# Patient Record
Sex: FEMALE | Race: WHITE
[De-identification: names, ages, dates, MRNs, and addresses within clinical notes are randomized per-mention and may not be internally consistent; named-entity substitution may affect disease eponyms.]

---

## 2017-05-16 ENCOUNTER — HOSPITAL ENCOUNTER (OUTPATIENT)
Dept: HOSPITAL 76 - LAB | Age: 62
Discharge: HOME | End: 2017-05-16
Attending: PHYSICIAN ASSISTANT
Payer: COMMERCIAL

## 2017-05-16 DIAGNOSIS — E55.9: ICD-10-CM

## 2017-05-16 DIAGNOSIS — Z79.899: ICD-10-CM

## 2017-05-16 DIAGNOSIS — Z00.00: Primary | ICD-10-CM

## 2017-05-16 DIAGNOSIS — F32.9: ICD-10-CM

## 2017-05-16 DIAGNOSIS — E78.2: ICD-10-CM

## 2017-05-16 LAB
ALBUMIN/GLOB SERPL: 1.2 {RATIO} (ref 1–2.2)
ANION GAP SERPL CALCULATED.4IONS-SCNC: 9 MMOL/L (ref 6–13)
BASOPHILS NFR BLD AUTO: 0 10^3/UL (ref 0–0.1)
BASOPHILS NFR BLD AUTO: 0.9 %
BILIRUB BLD-MCNC: 0.7 MG/DL (ref 0.2–1)
BUN SERPL-MCNC: 15 MG/DL (ref 6–20)
CALCIUM UR-MCNC: 9.1 MG/DL (ref 8.5–10.3)
CHLORIDE SERPL-SCNC: 105 MMOL/L (ref 101–111)
CHOLEST SERPL-MCNC: 186 MG/DL
CO2 SERPL-SCNC: 26 MMOL/L (ref 21–32)
CREAT SERPLBLD-SCNC: 0.8 MG/DL (ref 0.4–1)
EOSINOPHIL # BLD AUTO: 0.3 10^3/UL (ref 0–0.7)
EOSINOPHIL NFR BLD AUTO: 4.8 %
ERYTHROCYTE [DISTWIDTH] IN BLOOD BY AUTOMATED COUNT: 14.2 % (ref 12–15)
GFRSERPLBLD MDRD-ARVRAT: 73 ML/MIN/{1.73_M2} (ref 89–?)
GLOBULIN SER-MCNC: 3.2 G/DL (ref 2.1–4.2)
GLUCOSE SERPL-MCNC: 108 MG/DL (ref 70–100)
HCT VFR BLD AUTO: 40.5 % (ref 37–47)
HDLC SERPL-MCNC: 46 MG/DL
HDLC SERPL: 4 {RATIO} (ref ?–4.4)
HGB UR QL STRIP: 13.9 G/DL (ref 12–16)
LDLC/HDLC SERPL: 2.6 {RATIO} (ref ?–4.4)
LYMPHOCYTES # SPEC AUTO: 1.8 10^3/UL (ref 1.5–3.5)
LYMPHOCYTES NFR BLD AUTO: 34.5 %
MCH RBC QN AUTO: 29.7 PG (ref 27–31)
MCHC RBC AUTO-ENTMCNC: 34.2 G/DL (ref 32–36)
MCV RBC AUTO: 86.8 FL (ref 81–99)
MONOCYTES # BLD AUTO: 0.4 10^3/UL (ref 0–1)
MONOCYTES NFR BLD AUTO: 6.8 %
NEUTROPHILS # BLD AUTO: 2.8 10^3/UL (ref 1.5–6.6)
NEUTROPHILS # SNV AUTO: 5.3 X10^3/UL (ref 4.8–10.8)
NEUTROPHILS NFR BLD AUTO: 53 %
NRBC # BLD AUTO: 0.1 /100WBC
PDW BLD AUTO: 8.6 FL (ref 7.9–10.8)
POTASSIUM SERPL-SCNC: 3.9 MMOL/L (ref 3.5–5)
PROT SPEC-MCNC: 7 G/DL (ref 6.7–8.2)
RBC MAR: 4.67 10^6/UL (ref 4.2–5.4)
SODIUM SERPLBLD-SCNC: 140 MMOL/L (ref 135–145)
TRIGL P FAST SERPL-MCNC: 99 MG/DL
VLDLC SERPL-SCNC: 20 MG/DL
WBC # BLD: 5.3 X10^3/UL

## 2017-05-16 PROCEDURE — 36415 COLL VENOUS BLD VENIPUNCTURE: CPT

## 2017-05-16 PROCEDURE — 85025 COMPLETE CBC W/AUTO DIFF WBC: CPT

## 2017-05-16 PROCEDURE — 80061 LIPID PANEL: CPT

## 2017-05-16 PROCEDURE — 82306 VITAMIN D 25 HYDROXY: CPT

## 2017-05-16 PROCEDURE — 84443 ASSAY THYROID STIM HORMONE: CPT

## 2017-05-16 PROCEDURE — 80053 COMPREHEN METABOLIC PANEL: CPT

## 2017-06-02 ENCOUNTER — HOSPITAL ENCOUNTER (OUTPATIENT)
Dept: HOSPITAL 76 - DI | Age: 62
Discharge: HOME | End: 2017-06-02
Attending: PHYSICIAN ASSISTANT
Payer: COMMERCIAL

## 2017-06-02 DIAGNOSIS — Z12.31: Primary | ICD-10-CM

## 2017-06-02 DIAGNOSIS — Z78.0: ICD-10-CM

## 2017-06-02 DIAGNOSIS — Z13.820: Primary | ICD-10-CM

## 2017-06-02 DIAGNOSIS — Z85.3: ICD-10-CM

## 2017-06-02 DIAGNOSIS — M85.88: ICD-10-CM

## 2017-06-02 PROCEDURE — 77080 DXA BONE DENSITY AXIAL: CPT

## 2017-06-02 PROCEDURE — 77067 SCR MAMMO BI INCL CAD: CPT

## 2017-06-04 NOTE — DEXA REPORT
DEXA SCAN: 06/02/2017



CLINICAL INDICATION: Postmenopausal. 



TECHNIQUE: Dual energy x-ray absorptiometry (DXA) was performed on a [employer] 
system. Regions measured are the AP spine, femoral neck, and, if needed, 
forearm.



COMPARISON: None. In accordance with the International Society for Clinical 
Densitometry (ISCD) guidelines, data from previous exams may be reanalyzed 
using current recommendations and techniques. This is done to allow a more 
accurate basis for comparison with the current study.



FINDINGS: The data for the lumbar spine is as follows:









REGION BMD (g/cm/cm) T-SCORE Z-SCORE

 

L1 0.993 -1.1 -1.0

 

L2 1.242 0.4 0.4

 

L3 1.251 0.4 0.5

 

L4 1.207 0.1 0.2

 

TOTAL 1.174 0.0 0.0





NOTE: All evaluable vertebrae are used for classification.



The data for the hip is as follows:









REGION BMD (g/cm/cm) T-SCORE Z-SCORE

 

Neck 0.888 -1.1 -0.6

 

TOTAL 0.848 -1.3 -1.1





NOTE: The femoral neck or total proximal femur, whichever is lowest, is used 
for classification.



* Denotes significant change at the 95% confidence level. 

** Denotes dissimilar scan types or analysis methods.



IMPRESSION: THE WHO CLASSIFICATION BASED ON THE INTERNATIONAL REFERENCE 
STANDARD IS OSTEOPENIA. THE FRACTURE RISK IS INCREASED. 



RECOMMENDATION: Patients with diagnosis of osteoporosis or osteopenia should 
have regular bone mineral density assessment. For those eligible for Medicare, 
routine testing is allowed once every 2 years. Testing frequency can be 
increased for patients who have rapidly progressing disease or for those who 
are receiving medical therapy to restore bone mass. 



COMMENT: World Health Organization (WHO) definitions for osteoporosis and 
osteopenia:

NORMAL BMD: T-score at -1.0 or higher, fracture risk is low.

OSTEOPENIA BMD: T-score between -1.0 and -2.5, fracture risk is increased.

OSTEOPOROSIS BMD: T-score at -2.5 or lower, fracture risk high.



National Osteoporosis Foundation recommends:

1. Obtain adequate dietary calcium (at least 1200 mg per day) and vitamin D (400
-800 international units per day).

2. Participate, as appropriate, in regular weightbearing and muscle-
strengthening exercise. 

3. Avoid tobacco use and reduce alcohol and caffeine intake. 

4. For more detailed information see the website at www.NOF.org.
MTDD

## 2017-06-05 NOTE — MAMMOGRAPHY REPORT
DIGITAL SCREENING MAMMOGRAM:  06/02/2017

 

CLINICAL INDICATION:  A 61-year-old with personal history of left breast cancer status post lumpectom
y and radiation therapy, history of benign left breast biopsy.

 

COMPARISON:  04/2016, 04/2015, 04/2014, 04/2013, 04/2012, 04/2011, 04/2010, 09/2009.

 

TECHNIQUE:  Routine CC and MLO projections were obtained of the breasts. 

 

FINDINGS:  The breasts again demonstrate heterogeneously dense fibroglandular parenchyma bilaterally.
  Postoperative and post-treatment changes in the left inner breast and post-biopsy changes in the le
ft upper outer quadrant are stable.  Coarse and punctate, typically benign calcifications are present
.  No suspicious masses, clustered microcalcifications, or regions of architectural distortion are id
entified. 

 

IMPRESSION:  BENIGN FINDINGS.

 

RECOMMENDATION:  Routine annual screening unless otherwise clinically indicated.

 

BIRADS CATEGORY 2 - BENIGN FINDINGS.

 

STANDARD QUALIFYING STATEMENTS

 

1. This examination was reviewed with the aid of Computer-Aided Detection (CAD).

 

2. A negative or benign imaging report should not delay biopsy if clinically suspicious findings are 
present. Consider surgical consultation if warranted. More than 5% of cancers are not identified by i
maging.

 

3. Dense breasts may obscure an underlying neoplasm.

 

 

 

 

 

DD:06/05/2017 13:21:50  DT: 06/05/2017 13:32  JOB #: [id number]  EXT JOB #:[id number]

## 2018-01-18 ENCOUNTER — HOSPITAL ENCOUNTER (OUTPATIENT)
Dept: HOSPITAL 76 - LAB | Age: 63
Discharge: HOME | End: 2018-01-18
Attending: PHYSICIAN ASSISTANT
Payer: COMMERCIAL

## 2018-01-18 DIAGNOSIS — R53.83: Primary | ICD-10-CM

## 2018-01-18 DIAGNOSIS — F32.9: ICD-10-CM

## 2018-01-18 LAB
T4 FREE SERPL-MCNC: 0.63 NG/DL (ref 0.58–1.64)
TSH SERPL-ACNC: 2.02 UIU/ML (ref 0.34–5.6)

## 2018-01-18 PROCEDURE — 84481 FREE ASSAY (FT-3): CPT

## 2018-01-18 PROCEDURE — 84443 ASSAY THYROID STIM HORMONE: CPT

## 2018-01-18 PROCEDURE — 84439 ASSAY OF FREE THYROXINE: CPT

## 2018-01-18 PROCEDURE — 36415 COLL VENOUS BLD VENIPUNCTURE: CPT

## 2018-06-01 ENCOUNTER — HOSPITAL ENCOUNTER (OUTPATIENT)
Dept: HOSPITAL 76 - LAB | Age: 63
Discharge: HOME | End: 2018-06-01
Attending: PHYSICIAN ASSISTANT
Payer: COMMERCIAL

## 2018-06-01 DIAGNOSIS — Z00.00: Primary | ICD-10-CM

## 2018-06-01 DIAGNOSIS — Z72.89: ICD-10-CM

## 2018-06-01 LAB
ALBUMIN DIAFP-MCNC: 3.9 G/DL (ref 3.2–5.5)
ALBUMIN/GLOB SERPL: 1.1 {RATIO} (ref 1–2.2)
ALP SERPL-CCNC: 118 IU/L (ref 42–121)
ALT SERPL W P-5'-P-CCNC: 16 IU/L (ref 10–60)
ANION GAP SERPL CALCULATED.4IONS-SCNC: 9 MMOL/L (ref 6–13)
AST SERPL W P-5'-P-CCNC: 18 IU/L (ref 10–42)
BASOPHILS NFR BLD AUTO: 0.1 10^3/UL (ref 0–0.1)
BASOPHILS NFR BLD AUTO: 1.1 %
BILIRUB BLD-MCNC: 1.3 MG/DL (ref 0.2–1)
BUN SERPL-MCNC: 13 MG/DL (ref 6–20)
CALCIUM UR-MCNC: 9 MG/DL (ref 8.5–10.3)
CHLORIDE SERPL-SCNC: 102 MMOL/L (ref 101–111)
CHOLEST SERPL-MCNC: 229 MG/DL
CO2 SERPL-SCNC: 24 MMOL/L (ref 21–32)
CREAT SERPLBLD-SCNC: 0.9 MG/DL (ref 0.4–1)
EOSINOPHIL # BLD AUTO: 0.2 10^3/UL (ref 0–0.7)
EOSINOPHIL NFR BLD AUTO: 4.5 %
ERYTHROCYTE [DISTWIDTH] IN BLOOD BY AUTOMATED COUNT: 14.2 % (ref 12–15)
GFRSERPLBLD MDRD-ARVRAT: 64 ML/MIN/{1.73_M2} (ref 89–?)
GLOBULIN SER-MCNC: 3.5 G/DL (ref 2.1–4.2)
GLUCOSE SERPL-MCNC: 119 MG/DL (ref 70–100)
HDLC SERPL-MCNC: 47 MG/DL
HDLC SERPL: 4.9 {RATIO} (ref ?–4.4)
HGB UR QL STRIP: 14.3 G/DL (ref 12–16)
LDLC SERPL CALC-MCNC: 152 MG/DL
LDLC/HDLC SERPL: 3.2 {RATIO} (ref ?–4.4)
LYMPHOCYTES # SPEC AUTO: 1.4 10^3/UL (ref 1.5–3.5)
LYMPHOCYTES NFR BLD AUTO: 28.4 %
MCH RBC QN AUTO: 29.5 PG (ref 27–31)
MCHC RBC AUTO-ENTMCNC: 34.1 G/DL (ref 32–36)
MCV RBC AUTO: 86.4 FL (ref 81–99)
MONOCYTES # BLD AUTO: 0.4 10^3/UL (ref 0–1)
MONOCYTES NFR BLD AUTO: 7.5 %
NEUTROPHILS # BLD AUTO: 3 10^3/UL (ref 1.5–6.6)
NEUTROPHILS # SNV AUTO: 5 X10^3/UL (ref 4.8–10.8)
NEUTROPHILS NFR BLD AUTO: 58.5 %
PDW BLD AUTO: 8.3 FL (ref 7.9–10.8)
PLATELET # BLD: 227 10^3/UL (ref 130–450)
PROT SPEC-MCNC: 7.4 G/DL (ref 6.7–8.2)
RBC MAR: 4.85 10^6/UL (ref 4.2–5.4)
SODIUM SERPLBLD-SCNC: 135 MMOL/L (ref 135–145)
VLDLC SERPL-SCNC: 30 MG/DL

## 2018-06-01 PROCEDURE — 80061 LIPID PANEL: CPT

## 2018-06-01 PROCEDURE — 84443 ASSAY THYROID STIM HORMONE: CPT

## 2018-06-01 PROCEDURE — 86803 HEPATITIS C AB TEST: CPT

## 2018-06-01 PROCEDURE — 83721 ASSAY OF BLOOD LIPOPROTEIN: CPT

## 2018-06-01 PROCEDURE — 36415 COLL VENOUS BLD VENIPUNCTURE: CPT

## 2018-06-01 PROCEDURE — 85025 COMPLETE CBC W/AUTO DIFF WBC: CPT

## 2018-06-01 PROCEDURE — 80053 COMPREHEN METABOLIC PANEL: CPT

## 2018-06-02 LAB
HEPATITIS C ANTIBODY: (no result)
SIGNAL TO CUT-OFF: 0.1 (ref ?–1)

## 2018-06-08 ENCOUNTER — HOSPITAL ENCOUNTER (OUTPATIENT)
Dept: HOSPITAL 76 - DI | Age: 63
Discharge: HOME | End: 2018-06-08
Attending: PHYSICIAN ASSISTANT
Payer: COMMERCIAL

## 2018-06-08 DIAGNOSIS — Z12.31: Primary | ICD-10-CM

## 2018-06-08 DIAGNOSIS — Z85.3: ICD-10-CM

## 2018-06-08 PROCEDURE — 77067 SCR MAMMO BI INCL CAD: CPT

## 2018-06-14 NOTE — MAMMOGRAPHY REPORT
Procedure Date:  06/08/2018   

Accession Number:  [id number] / [id number]                    

Procedure:  RUBINA - Screening Mammo Dig Bilat CPT Code:  

 

FULL RESULT:

 

 

EXAM: Screening Mammo Dig Bilat

 

DATE: 6/8/2018 4:30 PM

 

CLINICAL HISTORY: 62-year-old with personal history of left breast cancer 

status post lumpectomy and radiation therapy for screening.

 

TECHNIQUE: Bilateral CC and MLO views were obtained.

 

COMPARISON: 6/2/2017, 4/25/2016, 4/22/2015, 4/15/2014, 4/11/2013, 

4/4/2012, 4/12/2011, 4/6/2010

 

FINDINGS:

The breasts demonstrate heterogeneously dense fibroglandular parenchyma 

bilaterally. Postoperative and posttreatment changes in the left inner 

breast are stable. Coarse, typically benign calcifications are present. 

Postbiopsy changes in the left outer breast are stable. No suspicious 

masses, clustered microcalcifications, or regions of architectural 

distortion are identified.

 

IMPRESSION:

Benign findings

 

RECOMMENDATION: Routine annual screening unless otherwise clinically 

indicated.

 

BIRADS CATEGORY 2: Benign findings

 

STANDARD QUALIFYING STATEMENTS:

1.  This examination was reviewed with the aid of Computer-Aided 

Detection (CAD).

2.  A negative or benign  imaging report should not delay biopsy if 

clinically suspicious findings are present.  Consider surgical 

consultation if warrented.  More than 5% of cancers are not identified by 

imaging.

3.  Dense breasts may obscure an underlying neoplasm.

## 2019-01-31 ENCOUNTER — HOSPITAL ENCOUNTER (OUTPATIENT)
Dept: HOSPITAL 76 - LAB | Age: 64
Discharge: HOME | End: 2019-01-31
Attending: PHYSICIAN ASSISTANT
Payer: COMMERCIAL

## 2019-01-31 DIAGNOSIS — E78.5: ICD-10-CM

## 2019-01-31 DIAGNOSIS — Z79.899: ICD-10-CM

## 2019-01-31 DIAGNOSIS — R73.9: Primary | ICD-10-CM

## 2019-01-31 LAB
ANION GAP SERPL CALCULATED.4IONS-SCNC: 11 MMOL/L (ref 6–13)
BUN SERPL-MCNC: 14 MG/DL (ref 6–20)
CALCIUM UR-MCNC: 9.1 MG/DL (ref 8.5–10.3)
CHLORIDE SERPL-SCNC: 98 MMOL/L (ref 101–111)
CHOLEST SERPL-MCNC: 246 MG/DL
CO2 SERPL-SCNC: 26 MMOL/L (ref 21–32)
CREAT SERPLBLD-SCNC: 0.9 MG/DL (ref 0.4–1)
EST. AVERAGE GLUCOSE BLD GHB EST-MCNC: 117 MG/DL (ref 70–100)
GFRSERPLBLD MDRD-ARVRAT: 63 ML/MIN/{1.73_M2} (ref 89–?)
GLUCOSE SERPL-MCNC: 116 MG/DL (ref 70–100)
HB2 TOTAL: 15.8 G/DL
HBA1C BLD-MCNC: 0.62 G/DL
HDLC SERPL-MCNC: 45 MG/DL
HDLC SERPL: 5.5 {RATIO} (ref ?–4.4)
HEMOGLOBIN A1C %: 5.7 % (ref 4.6–6.2)
LDLC SERPL CALC-MCNC: 170 MG/DL
LDLC/HDLC SERPL: 3.8 {RATIO} (ref ?–4.4)
SODIUM SERPLBLD-SCNC: 135 MMOL/L (ref 135–145)
VLDLC SERPL-SCNC: 31 MG/DL

## 2019-01-31 PROCEDURE — 83036 HEMOGLOBIN GLYCOSYLATED A1C: CPT

## 2019-01-31 PROCEDURE — 80061 LIPID PANEL: CPT

## 2019-01-31 PROCEDURE — 83721 ASSAY OF BLOOD LIPOPROTEIN: CPT

## 2019-01-31 PROCEDURE — 80048 BASIC METABOLIC PNL TOTAL CA: CPT

## 2019-01-31 PROCEDURE — 36415 COLL VENOUS BLD VENIPUNCTURE: CPT

## 2019-11-07 ENCOUNTER — HOSPITAL ENCOUNTER (OUTPATIENT)
Dept: HOSPITAL 76 - DI.N | Age: 64
Discharge: HOME | End: 2019-11-07
Attending: GENERAL ACUTE CARE HOSPITAL
Payer: COMMERCIAL

## 2019-11-07 DIAGNOSIS — Z12.31: Primary | ICD-10-CM

## 2019-11-07 DIAGNOSIS — Z85.3: ICD-10-CM

## 2019-11-07 DIAGNOSIS — Z08: ICD-10-CM

## 2019-11-07 PROCEDURE — 77067 SCR MAMMO BI INCL CAD: CPT

## 2019-11-08 NOTE — MAMMOGRAPHY REPORT
Reason:  SCREENING MAMMO

Procedure Date:  11/07/2019   

Accession Number:  [id number] / [id number]                    

Procedure:  MGN - Screening Mammo Dig Bilat CPT Code:  

 

***Final Report***

 

 

FULL RESULT:

 

 

EXAM: Screening Mammo Dig Bilat

 

DATE: 11/7/2019 2:57 PM

 

CLINICAL HISTORY:  Screening encounter. History of early menses and 

personal breast cancer history in the left breast status post lumpectomy, 

2008.

 

TECHNIQUE: (B) - Bilateral  CC and MLO views were obtained.

 

COMPARISON: 6/8/2018 through 4/11/2013.

 

PARENCHYMAL PATTERN: (D) - The breast(s) demonstrate(s) heterogeneously 

dense fibroglandular parenchyma.

 

FINDINGS:

Posttreatment changes in the left breast are stable. There are bilateral 

typically benign vascular calcifications. There are no suspicious masses, 

calcifications, or areas of distortion.

 

IMPRESSION: Benign findings. BI-RADS category 2.

 

RECOMMENDATION: (ANNUAL)  - Recommend routine annual screening 

mammography.

 

BI-RADS CATEGORY: (2) - Benign Findings.

 

STANDARD QUALIFYING STATEMENTS:

1.  This examination was not reviewed with the aid of Computer-Aided 

Detection (CAD).

2.  A negative or benign  imaging report should not preclude biopsy if 

clinically suspicious findings are present.

3.  Dense breasts may obscure an underlying neoplasm.

4. This examination was reviewed without the aid of 3D breast imaging 

(tomosynthesis).

## 2020-03-02 ENCOUNTER — HOSPITAL ENCOUNTER (OUTPATIENT)
Dept: HOSPITAL 76 - LAB | Age: 65
Discharge: HOME | End: 2020-03-02
Attending: NURSE PRACTITIONER
Payer: COMMERCIAL

## 2020-03-02 DIAGNOSIS — E55.9: ICD-10-CM

## 2020-03-02 DIAGNOSIS — R73.9: ICD-10-CM

## 2020-03-02 DIAGNOSIS — Z79.899: ICD-10-CM

## 2020-03-02 DIAGNOSIS — Z00.00: Primary | ICD-10-CM

## 2020-03-02 DIAGNOSIS — M85.80: ICD-10-CM

## 2020-03-02 LAB
ALBUMIN DIAFP-MCNC: 3.8 G/DL (ref 3.2–5.5)
ALBUMIN/GLOB SERPL: 1.1 {RATIO} (ref 1–2.2)
ALP SERPL-CCNC: 97 IU/L (ref 42–121)
ALT SERPL W P-5'-P-CCNC: 18 IU/L (ref 10–60)
ANION GAP SERPL CALCULATED.4IONS-SCNC: 12 MMOL/L (ref 6–13)
AST SERPL W P-5'-P-CCNC: 20 IU/L (ref 10–42)
BASOPHILS NFR BLD AUTO: 0 10^3/UL (ref 0–0.1)
BASOPHILS NFR BLD AUTO: 0.6 %
BILIRUB BLD-MCNC: 1.1 MG/DL (ref 0.2–1)
BUN SERPL-MCNC: 13 MG/DL (ref 6–20)
CALCIUM UR-MCNC: 8.9 MG/DL (ref 8.5–10.3)
CHLORIDE SERPL-SCNC: 103 MMOL/L (ref 101–111)
CHOLEST SERPL-MCNC: 218 MG/DL
CO2 SERPL-SCNC: 21 MMOL/L (ref 21–32)
CREAT SERPLBLD-SCNC: 1 MG/DL (ref 0.4–1)
EOSINOPHIL # BLD AUTO: 0.3 10^3/UL (ref 0–0.7)
EOSINOPHIL NFR BLD AUTO: 4.2 %
ERYTHROCYTE [DISTWIDTH] IN BLOOD BY AUTOMATED COUNT: 13.5 % (ref 12–15)
EST. AVERAGE GLUCOSE BLD GHB EST-MCNC: 120 MG/DL (ref 70–100)
GFRSERPLBLD MDRD-ARVRAT: 56 ML/MIN/{1.73_M2} (ref 89–?)
GLOBULIN SER-MCNC: 3.4 G/DL (ref 2.1–4.2)
GLUCOSE SERPL-MCNC: 168 MG/DL (ref 70–100)
HB2 TOTAL: 15.6 G/DL
HBA1C BLD-MCNC: 0.62 G/DL
HDLC SERPL-MCNC: 45 MG/DL
HDLC SERPL: 4.8 {RATIO} (ref ?–4.4)
HEMOGLOBIN A1C %: 5.8 % (ref 4.6–6.2)
HGB UR QL STRIP: 14.8 G/DL (ref 12–16)
LDLC SERPL CALC-MCNC: 130 MG/DL
LDLC/HDLC SERPL: 2.9 {RATIO} (ref ?–4.4)
LYMPHOCYTES # SPEC AUTO: 2 10^3/UL (ref 1.5–3.5)
LYMPHOCYTES NFR BLD AUTO: 28.3 %
MCH RBC QN AUTO: 28.8 PG (ref 27–31)
MCHC RBC AUTO-ENTMCNC: 32.8 G/DL (ref 32–36)
MCV RBC AUTO: 87.9 FL (ref 81–99)
MONOCYTES # BLD AUTO: 0.5 10^3/UL (ref 0–1)
MONOCYTES NFR BLD AUTO: 7.3 %
NEUTROPHILS # BLD AUTO: 4.1 10^3/UL (ref 1.5–6.6)
NEUTROPHILS # SNV AUTO: 6.9 X10^3/UL (ref 4.8–10.8)
NEUTROPHILS NFR BLD AUTO: 59.5 %
PDW BLD AUTO: 10.3 FL (ref 7.9–10.8)
PLATELET # BLD: 257 10^3/UL (ref 130–450)
PROT SPEC-MCNC: 7.2 G/DL (ref 6.7–8.2)
RBC MAR: 5.13 10^6/UL (ref 4.2–5.4)
SODIUM SERPLBLD-SCNC: 136 MMOL/L (ref 135–145)
VLDLC SERPL-SCNC: 43 MG/DL

## 2020-03-02 PROCEDURE — 82306 VITAMIN D 25 HYDROXY: CPT

## 2020-03-02 PROCEDURE — 83036 HEMOGLOBIN GLYCOSYLATED A1C: CPT

## 2020-03-02 PROCEDURE — 83721 ASSAY OF BLOOD LIPOPROTEIN: CPT

## 2020-03-02 PROCEDURE — 36415 COLL VENOUS BLD VENIPUNCTURE: CPT

## 2020-03-02 PROCEDURE — 84443 ASSAY THYROID STIM HORMONE: CPT

## 2020-03-02 PROCEDURE — 80053 COMPREHEN METABOLIC PANEL: CPT

## 2020-03-02 PROCEDURE — 85025 COMPLETE CBC W/AUTO DIFF WBC: CPT

## 2020-03-02 PROCEDURE — 80061 LIPID PANEL: CPT

## 2020-06-25 ENCOUNTER — HOSPITAL ENCOUNTER (OUTPATIENT)
Dept: HOSPITAL 76 - SDS | Age: 65
Discharge: HOME | End: 2020-06-25
Attending: SURGERY
Payer: COMMERCIAL

## 2020-06-25 VITALS — SYSTOLIC BLOOD PRESSURE: 145 MMHG | DIASTOLIC BLOOD PRESSURE: 76 MMHG

## 2020-06-25 DIAGNOSIS — D12.2: ICD-10-CM

## 2020-06-25 DIAGNOSIS — I25.10: ICD-10-CM

## 2020-06-25 DIAGNOSIS — K64.8: ICD-10-CM

## 2020-06-25 DIAGNOSIS — Z12.11: Primary | ICD-10-CM

## 2020-06-25 PROCEDURE — 0DBF8ZZ EXCISION OF RIGHT LARGE INTESTINE, VIA NATURAL OR ARTIFICIAL OPENING ENDOSCOPIC: ICD-10-PCS | Performed by: SURGERY

## 2020-06-25 PROCEDURE — 45380 COLONOSCOPY AND BIOPSY: CPT

## 2021-03-18 ENCOUNTER — HOSPITAL ENCOUNTER (OUTPATIENT)
Dept: HOSPITAL 76 - LAB | Age: 66
Discharge: HOME | End: 2021-03-18
Attending: NURSE PRACTITIONER
Payer: COMMERCIAL

## 2021-03-18 DIAGNOSIS — R73.9: ICD-10-CM

## 2021-03-18 DIAGNOSIS — E55.9: ICD-10-CM

## 2021-03-18 DIAGNOSIS — Z79.899: ICD-10-CM

## 2021-03-18 DIAGNOSIS — E78.49: ICD-10-CM

## 2021-03-18 DIAGNOSIS — Z00.00: Primary | ICD-10-CM

## 2021-03-18 LAB
ALBUMIN DIAFP-MCNC: 3.7 G/DL (ref 3.2–5.5)
ALBUMIN/GLOB SERPL: 1.1 {RATIO} (ref 1–2.2)
ALP SERPL-CCNC: 119 IU/L (ref 42–121)
ALT SERPL W P-5'-P-CCNC: 15 IU/L (ref 10–60)
ANION GAP SERPL CALCULATED.4IONS-SCNC: 11 MMOL/L (ref 6–13)
AST SERPL W P-5'-P-CCNC: 16 IU/L (ref 10–42)
BASOPHILS NFR BLD AUTO: 0 10^3/UL (ref 0–0.1)
BASOPHILS NFR BLD AUTO: 0.8 %
BILIRUB BLD-MCNC: 1 MG/DL (ref 0.2–1)
BUN SERPL-MCNC: 12 MG/DL (ref 6–20)
CALCIUM UR-MCNC: 9 MG/DL (ref 8.5–10.3)
CHLORIDE SERPL-SCNC: 99 MMOL/L (ref 101–111)
CHOLEST SERPL-MCNC: 239 MG/DL
CO2 SERPL-SCNC: 25 MMOL/L (ref 21–32)
CREAT SERPLBLD-SCNC: 1 MG/DL (ref 0.4–1)
EOSINOPHIL # BLD AUTO: 0.2 10^3/UL (ref 0–0.7)
EOSINOPHIL NFR BLD AUTO: 3.9 %
ERYTHROCYTE [DISTWIDTH] IN BLOOD BY AUTOMATED COUNT: 13.7 % (ref 12–15)
GFRSERPLBLD MDRD-ARVRAT: 56 ML/MIN/{1.73_M2} (ref 89–?)
GLOBULIN SER-MCNC: 3.3 G/DL (ref 2.1–4.2)
GLUCOSE SERPL-MCNC: 121 MG/DL (ref 70–100)
HCT VFR BLD AUTO: 45 % (ref 37–47)
HDLC SERPL-MCNC: 48 MG/DL
HDLC SERPL: 5 {RATIO} (ref ?–4.4)
HGB UR QL STRIP: 14.8 G/DL (ref 12–16)
LDLC SERPL CALC-MCNC: 158 MG/DL
LDLC/HDLC SERPL: 3.3 {RATIO} (ref ?–4.4)
LYMPHOCYTES # SPEC AUTO: 1.7 10^3/UL (ref 1.5–3.5)
LYMPHOCYTES NFR BLD AUTO: 34 %
MCH RBC QN AUTO: 30.1 PG (ref 27–31)
MCHC RBC AUTO-ENTMCNC: 32.9 G/DL (ref 32–36)
MCV RBC AUTO: 91.5 FL (ref 81–99)
MONOCYTES # BLD AUTO: 0.4 10^3/UL (ref 0–1)
MONOCYTES NFR BLD AUTO: 7.6 %
NEUTROPHILS # BLD AUTO: 2.6 10^3/UL (ref 1.5–6.6)
NEUTROPHILS # SNV AUTO: 4.9 X10^3/UL (ref 4.8–10.8)
NEUTROPHILS NFR BLD AUTO: 53.7 %
NRBC # BLD AUTO: 0 /100WBC
NRBC # BLD AUTO: 0 X10^3/UL
PDW BLD AUTO: 10.3 FL (ref 7.9–10.8)
PLATELET # BLD: 240 10^3/UL (ref 130–450)
POTASSIUM SERPL-SCNC: 3.9 MMOL/L (ref 3.5–5)
PROT SPEC-MCNC: 7 G/DL (ref 6.7–8.2)
RBC MAR: 4.92 10^6/UL (ref 4.2–5.4)
SODIUM SERPLBLD-SCNC: 135 MMOL/L (ref 135–145)
TRIGL P FAST SERPL-MCNC: 165 MG/DL
TSH SERPL-ACNC: 2.42 UIU/ML (ref 0.34–5.6)
VLDLC SERPL-SCNC: 33 MG/DL

## 2021-03-18 PROCEDURE — 85025 COMPLETE CBC W/AUTO DIFF WBC: CPT

## 2021-03-18 PROCEDURE — 82306 VITAMIN D 25 HYDROXY: CPT

## 2021-03-18 PROCEDURE — 84443 ASSAY THYROID STIM HORMONE: CPT

## 2021-03-18 PROCEDURE — 80053 COMPREHEN METABOLIC PANEL: CPT

## 2021-03-18 PROCEDURE — 83721 ASSAY OF BLOOD LIPOPROTEIN: CPT

## 2021-03-18 PROCEDURE — 80061 LIPID PANEL: CPT

## 2021-03-18 PROCEDURE — 36415 COLL VENOUS BLD VENIPUNCTURE: CPT

## 2021-04-09 ENCOUNTER — HOSPITAL ENCOUNTER (OUTPATIENT)
Dept: HOSPITAL 76 - DI | Age: 66
Discharge: HOME | End: 2021-04-09
Attending: NURSE PRACTITIONER
Payer: COMMERCIAL

## 2021-04-09 DIAGNOSIS — Z85.3: ICD-10-CM

## 2021-04-09 DIAGNOSIS — Z08: ICD-10-CM

## 2021-04-09 DIAGNOSIS — Z12.31: Primary | ICD-10-CM

## 2021-12-21 ENCOUNTER — HOSPITAL ENCOUNTER (OUTPATIENT)
Dept: HOSPITAL 76 - LAB.WCP | Age: 66
Discharge: HOME | End: 2021-12-21
Attending: NURSE PRACTITIONER
Payer: MEDICARE

## 2021-12-21 DIAGNOSIS — R03.0: ICD-10-CM

## 2021-12-21 DIAGNOSIS — Z01.84: Primary | ICD-10-CM

## 2021-12-21 DIAGNOSIS — E78.49: ICD-10-CM

## 2021-12-21 DIAGNOSIS — F32.A: ICD-10-CM

## 2021-12-21 DIAGNOSIS — Z13.220: ICD-10-CM

## 2021-12-21 DIAGNOSIS — R73.9: ICD-10-CM

## 2021-12-21 LAB
ALBUMIN DIAFP-MCNC: 3.9 G/DL (ref 3.2–5.5)
ALBUMIN/GLOB SERPL: 1.1 {RATIO} (ref 1–2.2)
ALP SERPL-CCNC: 118 IU/L (ref 42–121)
ALT SERPL W P-5'-P-CCNC: 12 IU/L (ref 10–60)
ANION GAP SERPL CALCULATED.4IONS-SCNC: 12 MMOL/L (ref 6–13)
AST SERPL W P-5'-P-CCNC: 16 IU/L (ref 10–42)
BASOPHILS NFR BLD AUTO: 0.1 10^3/UL (ref 0–0.1)
BASOPHILS NFR BLD AUTO: 1 %
BILIRUB BLD-MCNC: 1.3 MG/DL (ref 0.2–1)
BUN SERPL-MCNC: 18 MG/DL (ref 6–20)
CALCIUM UR-MCNC: 9.4 MG/DL (ref 8.5–10.3)
CHLORIDE SERPL-SCNC: 99 MMOL/L (ref 101–111)
CHOLEST SERPL-MCNC: 240 MG/DL
CLARITY UR REFRACT.AUTO: (no result)
CO2 SERPL-SCNC: 25 MMOL/L (ref 21–32)
CREAT SERPLBLD-SCNC: 1.2 MG/DL (ref 0.4–1)
EOSINOPHIL # BLD AUTO: 0.2 10^3/UL (ref 0–0.7)
EOSINOPHIL NFR BLD AUTO: 3.2 %
ERYTHROCYTE [DISTWIDTH] IN BLOOD BY AUTOMATED COUNT: 14.1 % (ref 12–15)
EST. AVERAGE GLUCOSE BLD GHB EST-MCNC: 108 MG/DL (ref 70–100)
GFRSERPLBLD MDRD-ARVRAT: 45 ML/MIN/{1.73_M2} (ref 89–?)
GLOBULIN SER-MCNC: 3.4 G/DL (ref 2.1–4.2)
GLUCOSE SERPL-MCNC: 118 MG/DL (ref 70–100)
GLUCOSE UR QL STRIP.AUTO: NEGATIVE MG/DL
HBA1C MFR BLD HPLC: 5.4 % (ref 4.27–6.07)
HCT VFR BLD AUTO: 45.3 % (ref 37–47)
HDLC SERPL-MCNC: 50 MG/DL
HDLC SERPL: 4.8 {RATIO} (ref ?–4.4)
HGB UR QL STRIP: 14.7 G/DL (ref 12–16)
KETONES UR QL STRIP.AUTO: NEGATIVE MG/DL
LDLC SERPL CALC-MCNC: 159 MG/DL
LDLC/HDLC SERPL: 3.2 {RATIO} (ref ?–4.4)
LYMPHOCYTES # SPEC AUTO: 1.9 10^3/UL (ref 1.5–3.5)
LYMPHOCYTES NFR BLD AUTO: 26 %
MCH RBC QN AUTO: 29.5 PG (ref 27–31)
MCHC RBC AUTO-ENTMCNC: 32.5 G/DL (ref 32–36)
MCV RBC AUTO: 90.8 FL (ref 81–99)
MONOCYTES # BLD AUTO: 0.5 10^3/UL (ref 0–1)
MONOCYTES NFR BLD AUTO: 7.5 %
NEUTROPHILS # BLD AUTO: 4.4 10^3/UL (ref 1.5–6.6)
NEUTROPHILS # SNV AUTO: 7.2 X10^3/UL (ref 4.8–10.8)
NEUTROPHILS NFR BLD AUTO: 62 %
NITRITE UR QL STRIP.AUTO: NEGATIVE
NRBC # BLD AUTO: 0 /100WBC
NRBC # BLD AUTO: 0 X10^3/UL
PDW BLD AUTO: 10.7 FL (ref 7.9–10.8)
PH UR STRIP.AUTO: 6 PH (ref 5–7.5)
PLATELET # BLD: 336 10^3/UL (ref 130–450)
POTASSIUM SERPL-SCNC: 3.8 MMOL/L (ref 3.5–5)
PROT SPEC-MCNC: 7.3 G/DL (ref 6.7–8.2)
PROT UR STRIP.AUTO-MCNC: NEGATIVE MG/DL
RBC # UR STRIP.AUTO: NEGATIVE /UL
RBC # URNS HPF: (no result) /HPF (ref 0–5)
RBC MAR: 4.99 10^6/UL (ref 4.2–5.4)
SODIUM SERPLBLD-SCNC: 136 MMOL/L (ref 135–145)
SP GR UR STRIP.AUTO: >=1.03 (ref 1–1.03)
SQUAMOUS URNS QL MICRO: (no result)
T4 FREE SERPL-MCNC: 0.77 NG/DL (ref 0.58–1.64)
TRIGL P FAST SERPL-MCNC: 153 MG/DL
TSH SERPL-ACNC: 6.1 UIU/ML (ref 0.34–5.6)
UROBILINOGEN UR QL STRIP.AUTO: (no result) E.U./DL
UROBILINOGEN UR STRIP.AUTO-MCNC: NEGATIVE MG/DL
VLDLC SERPL-SCNC: 31 MG/DL
WBC # UR MANUAL: >25 /HPF (ref 0–5)

## 2021-12-21 PROCEDURE — 81001 URINALYSIS AUTO W/SCOPE: CPT

## 2021-12-21 PROCEDURE — 83036 HEMOGLOBIN GLYCOSYLATED A1C: CPT

## 2021-12-21 PROCEDURE — 86803 HEPATITIS C AB TEST: CPT

## 2021-12-21 PROCEDURE — 80061 LIPID PANEL: CPT

## 2021-12-21 PROCEDURE — 84443 ASSAY THYROID STIM HORMONE: CPT

## 2021-12-21 PROCEDURE — 80053 COMPREHEN METABOLIC PANEL: CPT

## 2021-12-21 PROCEDURE — 83721 ASSAY OF BLOOD LIPOPROTEIN: CPT

## 2021-12-21 PROCEDURE — 87086 URINE CULTURE/COLONY COUNT: CPT

## 2021-12-21 PROCEDURE — 84439 ASSAY OF FREE THYROXINE: CPT

## 2021-12-21 PROCEDURE — 36415 COLL VENOUS BLD VENIPUNCTURE: CPT

## 2021-12-21 PROCEDURE — 85025 COMPLETE CBC W/AUTO DIFF WBC: CPT

## 2021-12-22 LAB
HEPATITIS C ANTIBODY: (no result)
SIGNAL TO CUT-OFF: 0 (ref ?–1)

## 2022-01-03 ENCOUNTER — HOSPITAL ENCOUNTER (OUTPATIENT)
Dept: HOSPITAL 76 - DI | Age: 67
Discharge: HOME | End: 2022-01-03
Attending: NURSE PRACTITIONER
Payer: MEDICARE

## 2022-01-03 DIAGNOSIS — M85.89: Primary | ICD-10-CM

## 2022-01-03 DIAGNOSIS — Z78.0: ICD-10-CM

## 2022-01-03 NOTE — DEXA REPORT
PROCEDURE: Dexa Spine and/or Hip

 

INDICATIONS: POST MENOPAUSAL

 

TECHNIQUE: Dual energy x-ray absorptiometry (DXA) was performed on a [employer] System.  Regions measur
ed are the AP Spine, femoral neck, and if needed forearm.

 

COMPARISON: None.

  

FINDINGS:

 

Lumbar Spine: 

Bone Mineral Density   1.214 g/cm/cm, T score  0.3, normal. 

 

Left Hip:

Bone Mineral Density  0.849 g/cm/cm, T score -1.3, osteopenia

 

Left Femoral Neck:

Bone Mineral Density  0.872 g/cm/cm, T score -1.2, osteopenia

 

(T score greater or equal to -1.0: NORMAL)

(T score from -1.1 to -2.4: OSTEOPENIA)

(T score less than or equal to -2.5 to: OSTEOPOROSIS)

 

Impression: 

1. Lumbar spine is within normal limits.

 

2. Left hip osteopenia.

 

Patients with diagnosis of osteoporosis or osteopenia should have regular bone mineral density assess
ment.  For those eligible for Medicare, routine testing is allowed once every 2 years.  Testing frequ
ency can be increased for patients who have rapidly progressing disease or for those who are receivin
g medical therapy to restore bone mass.

 

Reviewed by: Nnamdi Baum MD on 1/3/2022 3:04 PM PST

Approved by: Nnamdi Baum MD on 1/3/2022 3:04 PM PST

 

 

Station ID:  [id number]

## 2022-09-09 ENCOUNTER — HOSPITAL ENCOUNTER (OUTPATIENT)
Dept: HOSPITAL 76 - DI | Age: 67
Discharge: HOME | End: 2022-09-09
Attending: GENERAL ACUTE CARE HOSPITAL
Payer: MEDICARE

## 2022-09-09 DIAGNOSIS — Z12.31: Primary | ICD-10-CM

## 2022-09-09 DIAGNOSIS — Z85.3: ICD-10-CM

## 2022-09-12 NOTE — MAMMOGRAPHY REPORT
BILATERAL DIGITAL SCREENING MAMMOGRAM 3D/2D: 9/9/2022

 

CLINICAL: Routine screening. Personal history of left breast cancer.  

 

Comparison is made to exams dated:  4/9/2021 mammogram, 11/7/2019 mammogram, 6/8/2018 mammogram, 6/2/
2017 mammogram, and 4/25/2016 mammogram - [facility].  

 

Both breasts are  heterogeneously dense, which may obscure small masses (category c / 51-75% glandula
r tissue).  

 

There are benign vascular calcifications in both breasts.  There also are benign post operative findi
ngs in the left breast.  

No significant masses, calcifications, or other findings are seen in either breast.  

There has been no significant interval change.

 

IMPRESSION: BENIGN

There is no mammographic evidence of malignancy. A 1 year screening mammogram is recommended.  

 

 

This exam was interpreted at Station ID: [id number].  

 

NOTE: For mammograms, a report in lay terms will be sent to the patient. Approximately 15% of breast 
malignancies will not be visualized mammographically. In the management of a palpable breast mass, a 
negative mammogram must not discourage biopsy of a clinically suspicious lesion.

 

Electronically Signed By: Cory Crawford          

acr/penrad:9/9/2022 15:24:38  

 

 

 

ACR BI-RADS Category 2: Benign Finding(s) 3342F

PARENCHYMAL PATTERN: (D) - The breast(s) demonstrate(s) heterogeneously dense fibroglandular geovanny snell.

BI-RADS CATEGORY: (2) - 2

RECOMMENDATION: (ANNUAL)  - Recommend routine annual screening mammography.

[id number]

1 year screening

LATERALITY: (B)

## 2023-08-21 ENCOUNTER — HOSPITAL ENCOUNTER (OUTPATIENT)
Dept: HOSPITAL 76 - LAB | Age: 68
Discharge: HOME | End: 2023-08-21
Attending: NURSE PRACTITIONER
Payer: MEDICARE

## 2023-08-21 DIAGNOSIS — R73.9: ICD-10-CM

## 2023-08-21 DIAGNOSIS — N18.31: ICD-10-CM

## 2023-08-21 DIAGNOSIS — Z13.220: ICD-10-CM

## 2023-08-21 DIAGNOSIS — I12.9: Primary | ICD-10-CM

## 2023-08-21 LAB
ALBUMIN DIAFP-MCNC: 4 G/DL (ref 3.2–5.5)
ALBUMIN/GLOB SERPL: 1.3 {RATIO} (ref 1–2.2)
ALP SERPL-CCNC: 124 IU/L (ref 42–121)
ALT SERPL W P-5'-P-CCNC: 11 IU/L (ref 10–60)
ANION GAP SERPL CALCULATED.4IONS-SCNC: 4 MMOL/L (ref 6–13)
AST SERPL W P-5'-P-CCNC: 13 IU/L (ref 10–42)
BASOPHILS NFR BLD AUTO: 0.1 10^3/UL (ref 0–0.1)
BASOPHILS NFR BLD AUTO: 0.9 %
BILIRUB BLD-MCNC: 0.6 MG/DL (ref 0.2–1)
BUN SERPL-MCNC: 18 MG/DL (ref 6–20)
CALCIUM UR-MCNC: 9.8 MG/DL (ref 8.5–10.3)
CHLORIDE SERPL-SCNC: 103 MMOL/L (ref 101–111)
CHOLEST SERPL-MCNC: 133 MG/DL
CO2 SERPL-SCNC: 32 MMOL/L (ref 21–32)
CREAT SERPLBLD-SCNC: 1.1 MG/DL (ref 0.6–1.3)
EOSINOPHIL # BLD AUTO: 0.3 10^3/UL (ref 0–0.7)
EOSINOPHIL NFR BLD AUTO: 4.6 %
ERYTHROCYTE [DISTWIDTH] IN BLOOD BY AUTOMATED COUNT: 13.2 % (ref 12–15)
EST. AVERAGE GLUCOSE BLD GHB EST-MCNC: 117 MG/DL (ref 70–100)
GFRSERPLBLD MDRD-ARVRAT: 50 ML/MIN/{1.73_M2} (ref 89–?)
GLOBULIN SER-MCNC: 3.1 G/DL (ref 2.1–4.2)
GLUCOSE SERPL-MCNC: 113 MG/DL (ref 74–104)
HBA1C MFR BLD HPLC: 5.7 % (ref 4.27–6.07)
HCT VFR BLD AUTO: 43.9 % (ref 37–47)
HDLC SERPL-MCNC: 52 MG/DL
HDLC SERPL: 2.6 {RATIO} (ref ?–4.4)
HGB UR QL STRIP: 14.4 G/DL (ref 12–16)
LDLC SERPL CALC-MCNC: 62 MG/DL
LDLC/HDLC SERPL: 1.2 {RATIO} (ref ?–4.4)
LYMPHOCYTES # SPEC AUTO: 2.3 10^3/UL (ref 1.5–3.5)
LYMPHOCYTES NFR BLD AUTO: 39.3 %
MCH RBC QN AUTO: 29.9 PG (ref 27–31)
MCHC RBC AUTO-ENTMCNC: 32.8 G/DL (ref 32–36)
MCV RBC AUTO: 91.1 FL (ref 81–99)
MONOCYTES # BLD AUTO: 0.5 10^3/UL (ref 0–1)
MONOCYTES NFR BLD AUTO: 8 %
NEUTROPHILS # BLD AUTO: 2.8 10^3/UL (ref 1.5–6.6)
NEUTROPHILS # SNV AUTO: 5.9 X10^3/UL (ref 4.8–10.8)
NEUTROPHILS NFR BLD AUTO: 47 %
NRBC # BLD AUTO: 0 /100WBC
NRBC # BLD AUTO: 0 X10^3/UL
PDW BLD AUTO: 10.1 FL (ref 7.9–10.8)
PLATELET # BLD: 231 10^3/UL (ref 130–450)
POTASSIUM SERPL-SCNC: 4 MMOL/L (ref 3.5–4.5)
PROT SPEC-MCNC: 7.1 G/DL (ref 6.4–8.9)
RBC MAR: 4.82 10^6/UL (ref 4.2–5.4)
SODIUM SERPLBLD-SCNC: 139 MMOL/L (ref 135–145)
TRIGL P FAST SERPL-MCNC: 93 MG/DL (ref 48–352)
TSH SERPL-ACNC: 5.01 UIU/ML (ref 0.34–5.6)
VLDLC SERPL-SCNC: 19 MG/DL

## 2023-08-21 PROCEDURE — 80053 COMPREHEN METABOLIC PANEL: CPT

## 2023-08-21 PROCEDURE — 84443 ASSAY THYROID STIM HORMONE: CPT

## 2023-08-21 PROCEDURE — 36415 COLL VENOUS BLD VENIPUNCTURE: CPT

## 2023-08-21 PROCEDURE — 83721 ASSAY OF BLOOD LIPOPROTEIN: CPT

## 2023-08-21 PROCEDURE — 80061 LIPID PANEL: CPT

## 2023-08-21 PROCEDURE — 83036 HEMOGLOBIN GLYCOSYLATED A1C: CPT

## 2023-08-21 PROCEDURE — 85025 COMPLETE CBC W/AUTO DIFF WBC: CPT

## 2023-09-12 ENCOUNTER — HOSPITAL ENCOUNTER (OUTPATIENT)
Dept: HOSPITAL 76 - DI | Age: 68
Discharge: HOME | End: 2023-09-12
Attending: GENERAL ACUTE CARE HOSPITAL
Payer: MEDICARE

## 2023-09-12 DIAGNOSIS — Z12.31: Primary | ICD-10-CM

## 2023-09-12 DIAGNOSIS — Z90.12: ICD-10-CM

## 2023-09-12 DIAGNOSIS — Z85.3: ICD-10-CM

## 2023-09-13 NOTE — MAMMOGRAPHY REPORT
BILATERAL DIGITAL SCREENING MAMMOGRAM 3D/2D: 9/12/2023

CLINICAL: Routine screening. Personal history of left breast cancer.  

 

Comparison is made to exams dated:  9/9/2022 mammogram, 4/9/2021 mammogram, 11/7/2019 mammogram, 6/8/
2018 mammogram, 6/2/2017 mammogram, and 4/25/2016 mammogram - [facility].  

 

Both breasts are heterogeneously dense, which may obscure small masses (category c / 51-75% glandular
 tissue).  

 

There are benign post operative findings from prior lumpectomy seen in the left breast.  No significa
nt masses, calcifications, or other findings are seen in either breast.  

IMPRESSION: BENIGN

Status post left lumpectomy. No mammographic evidence of malignancy. A 1 year screening mammogram is 
recommended.  

 

 

This exam was interpreted at Station ID: [id number].  

 

NOTE: For mammograms, a report in lay terms will be sent to the patient. Approximately 15% of breast 
malignancies will not be visualized mammographically. In the management of a palpable breast mass, a 
negative mammogram must not discourage biopsy of a clinically suspicious lesion.

 

Electronically Signed By: Elaine Chopra M.D.          

esb/:9/12/2023 16:30:59  

 

 

letter sent: No_Letter  

ACR BI-RADS Category 2: Benign Finding(s) 3342F

PARENCHYMAL PATTERN: (D) - The breast(s) demonstrate(s) heterogeneously dense fibroglandular parenchy
ma.

BI-RADS CATEGORY: (2) - 2

Mammogram

[id number]

1 year screening

LATERALITY: (B)

## 2024-01-09 ENCOUNTER — HOSPITAL ENCOUNTER (EMERGENCY)
Dept: HOSPITAL 76 - ED | Age: 69
Discharge: HOME | End: 2024-01-09
Payer: MEDICARE

## 2024-01-09 VITALS — OXYGEN SATURATION: 96 %

## 2024-01-09 VITALS — DIASTOLIC BLOOD PRESSURE: 80 MMHG | SYSTOLIC BLOOD PRESSURE: 179 MMHG

## 2024-01-09 DIAGNOSIS — S62.661B: Primary | ICD-10-CM

## 2024-01-09 DIAGNOSIS — Z23: ICD-10-CM
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
## 2024-01-09 PROCEDURE — 99283 EMERGENCY DEPT VISIT LOW MDM: CPT

## 2024-01-09 PROCEDURE — 99284 EMERGENCY DEPT VISIT MOD MDM: CPT

## 2024-01-09 PROCEDURE — 12002 RPR S/N/AX/GEN/TRNK2.6-7.5CM: CPT

## 2024-01-09 PROCEDURE — 90471 IMMUNIZATION ADMIN: CPT

## 2024-01-09 NOTE — ED PHYSICIAN DOCUMENTATION
PD HPI UPPER EXT INJURY





- Stated complaint


Stated Complaint: LT HAND INDEX INJ





- Chief complaint


Chief Complaint: Laceration





- History obtained from


History obtained from: Patient





- History of Present Illness


Location: Left, Finger, Other (left infex finger)


Where injury occurred: Home





- Additonal information


Additional information: 


68-year-old female presents emergency department today for accidentally shutting

her left index finger in the car door.  Patient denies use of blood thinners, 

bleeding is well-controlled, she says that she is not up-to-date with her 

tetanus shot.  There is pain and throbbing to the left index finger.  She does 

have full range of motion although not without pain.





PD PAST MEDICAL HISTORY





- Past Medical History


Past Medical History: Yes


Cardiovascular: MI


Respiratory: None


Endocrine/Autoimmune: None


GI: Colon polyps


: None


HEENT: Chronic vision loss


Psych: Depression


Musculoskeletal: None


Derm: None





- Past Surgical History


Past Surgical History: Yes


General: Colonoscopy


/GYN: Tubal ligation, Hysterectomy





- Present Medications


Home Medications: 


                                Ambulatory Orders











 Medication  Instructions  Recorded  Confirmed


 


Cholecalciferol (Vitamin D3) 2,000 unit PO DAILY 06/04/13 06/12/20





[Vitamin D]   


 


Red Yeast Rice 600 mg PO DAILY 06/05/13 06/12/20


 


Venlafaxine HCl [Effexor Xr] 150 mg PO ONCEDAILY 06/05/13 06/12/20


 


ARIPiprazole [Abilify] 5 mg PO DAILY 06/02/20 06/12/20


 


Calcium Carbonate [Calcium] 600 mg PO DAILY 06/02/20 06/12/20


 


EPINEPHrine [Epinephrine] 0.3 mg IJ ONCE PRN 06/02/20 06/12/20


 


Zolpidem Tartrate 10 mg PO QPM PRN 06/02/20 06/12/20


 


cephALEXin [Keflex] 500 mg PO Q6H 5 Days #20 01/09/24 














- Allergies


Allergies/Adverse Reactions: 


                                    Allergies











Allergy/AdvReac Type Severity Reaction Status Date / Time


 


penicillin V [Penicillin V] Allergy Severe Respiratory Verified 01/09/24 17:14


 


venom-honey bee Allergy Severe Respiratory Verified 01/09/24 17:14





[bee venom (honey bee)]     














- Social History


Does the pt smoke?: No


Smoking Status: Never smoker


Does the pt drink ETOH?: No


Does the pt have substance abuse?: No





- Immunizations


Immunizations are current?: No





- POLST


Patient has POLST: No





PD ED PE NORMAL





- Vitals


Vital signs reviewed: Yes





- General


General: Alert and oriented X 3





- Derm


Derm: Other (left index finger, volar laceration with tendon exposure, full ROM 

to DIP and PIP)





- Extremities


Extremities: No deformity, Other (Left index finger laceration)





Results





- Vitals


Vitals: 


                               Vital Signs - 24 hr











  01/09/24 01/09/24





  17:14 19:04


 


Temperature 36.8 C 


 


Heart Rate 88 68


 


Respiratory 18 16





Rate  


 


Blood Pressure 160/90 H 179/80 H


 


O2 Saturation 96 96








                                     Oxygen











O2 Source                      Room air

















- Rads (name of study)


  ** left index finger xray


Relevant Findings:: Final report received, EMP independent interpretation of 

test (left index DIP small bone fragment)





Procedures





- Laceration (location)


  ** Finger left


Length in cm: 3 (Left index Volar DIP)


Wound type: Flap, Clean, Exposure of cartilage


Neurovascular status: Sensory intact, Motor intact, Vascular intact


Tendon involvement: Tendon intact


Anesthesia: Marcaine 0.25%, Other (digital block)


Wound preparation: Irrigated copiously NS


Skin layer closure: Nylon, Sutures - enter # (4)


Other: Patient tolerated well, No complications, Neurovascular intact, Dressing 

applied, Tetanus booster given





PD Medical Decision Making





- ED course


ED course: 


Wound inspected under direct bright light with good visualization. Area with 

linear laceration across soft tissue through adipose with exposure of tendon. No

overt foreign body. Area hemostatic. Neurovascularly intact. Area extensively 

irrigated with sterile normal saline under pressure. Laceration repaired in 

simple fashion (please see procedure note for further details). Xray complete 

and reveals possible fracture to DIP making it an open fracture, 1G IM Anfec 

given and pt started on PO Keflex outpatient, abx sent to pts preferred 

pharmacy.





Patient tolerated procedure well and neurovascular exam intact and unchanged 

post repair with intact distal pulses and cap refill. Cautious return 

precautions discussed w/ full understanding. Wound care discussed. Prompt follow

up with primary care physician discussed and return for suture removal in 14 

days.








Departure





- Departure


Disposition: 01 Home, Self Care


Clinical Impression: 


Open finger fracture


Qualifiers:


 Encounter type: initial encounter Finger: index finger Phalanx: distal Fracture

alignment: nondisplaced Laterality: left Qualified Code(s): S62.661B - Nondis

placed fracture of distal phalanx of left index finger, initial encounter for 

open fracture





Laceration of finger without damage to nail


Qualifiers:


 Encounter type: initial encounter Finger: index finger Foreign body presence: 

without foreign body Laterality: left Qualified Code(s): S61.211A - Laceration 

without foreign body of left index finger without damage to nail, initial 

encounter





Condition: Good


Instructions:  ED Fx Finger Open Ch


Prescriptions: 


cephALEXin [Keflex] 500 mg PO Q6H 5 Days #20


Comments: 


Thank you for trusting us with your care we have placed a total of 4 sutures in 

your left index finger he will need to have these removed in 14 days by your 

primary care provider.  We have given you a shot of Ancef (antibiotic) because 

this is an open fracture and we are going to give you an antibiotic called 

Keflex that you will take 4 times a day for the next 5 days.  If you start to 

notice any signs or symptoms of infection which include fevers, chills, 

worsening finger pain, worsening finger swelling, drainage that is yellow/green,

 or streak up your arm please come back to the emergency department for further 

evaluation.  Cleanse your wound with soap and water twice a day keep it covered 

with bacitracin that you already have at home as well as gauze and finger 

splint.  You can wear the finger splint for the next few days for comfort but 

make sure that periodically throughout the day you are taking the finger splint 

off and gently bending your finger.





My narcotic instructions


I am prescribing a short course of narcotic pain medication for you. These are 

potentially dangerous and addictive medications that should be used carefully.


These medications may constipate you. Take an over-the-counter stool softener 

(docusate) twice daily with plenty of water while taking these medications. If 

you go 24 hours without a bowel movement, take over-the-counter miralax, per 

package instructions.


Do not drink or drive while taking these medications.


If you received narcotic or sedating medications while in the emergency 

department, do not drive for 24 hours.


Store this medication in a safe, secure place and out of reach of children.


It is a violation of federal law to give or sell this medication to another 

person or to use in a manner other than prescribed.


The ED will not refill narcotic prescriptions, including prescriptions lost or 

stolen.


To dispose of unwanted medications:


1. [facility] at [address] in 

[place] has a medication drop box. They accept prescription medications (in 

pill form) Monday through Friday 9:00 a.m. to 5:00 p.m.


2. [facility] [employer] accepts prescription medications (in

 pill form only) for disposal year round. Call [phone] for more 

information.


3. Contact the [facility] for the next [facility] sponsored prescription 

drug collection event. [phone], [phone], or [phone] 

[phone];


Note that many narcotic pain relievers also contain Tylenol/acetaminophen. 

Please ensure that your total dose of acetaminophen from all sources does not 

exceed 3 g (3000 mg) per day.





If you have any concerning symptoms please come back to the emergency department

 for further evaluation.  Wishing you a speedy recovery


1


Forms:  PCP List


Discharge Date/Time: 01/09/24 19:21

## 2024-01-09 NOTE — XRAY REPORT
PROCEDURE:  Hand 3+V LT

 

INDICATIONS:  index finger injury/pain

 

TECHNIQUE:  3 views of the hand(s) acquired.  

 

COMPARISON:  None.

 

FINDINGS:  

 

Bones:  Tiny osseous fragment adjacent to the ulnar aspect of the second DIP..  No suspicious bony le
sions.  

 

Soft tissues:  No suspicious soft tissue calcifications or masses. Soft tissue swelling of the second
 digit. 

 

 

IMPRESSION:  

Tiny osseous fragment within the ulnar aspect of the second DIP. Fracture is not excluded. Recommend 
follow-up radiograph in 7-10 days to assess stability.

 

 

 

Reviewed by: Walter Girard MD on 1/9/2024 5:40 PM PST

Approved by: Walter Girard MD on 1/9/2024 5:40 PM PST

 

 

Station ID:  [id number]

## 2024-01-11 ENCOUNTER — HOSPITAL ENCOUNTER (OUTPATIENT)
Dept: HOSPITAL 76 - LAB.WCP | Age: 69
Discharge: HOME | End: 2024-01-11
Attending: NURSE PRACTITIONER
Payer: MEDICARE

## 2024-01-11 DIAGNOSIS — I10: Primary | ICD-10-CM

## 2024-01-11 LAB
CLARITY UR REFRACT.AUTO: CLEAR
GLUCOSE UR QL STRIP.AUTO: NEGATIVE MG/DL
KETONES UR QL STRIP.AUTO: NEGATIVE MG/DL
NITRITE UR QL STRIP.AUTO: NEGATIVE
PH UR STRIP.AUTO: 6 PH (ref 5–7.5)
PROT UR STRIP.AUTO-MCNC: NEGATIVE MG/DL
RBC # UR STRIP.AUTO: NEGATIVE /UL
SP GR UR STRIP.AUTO: 1.01 (ref 1–1.03)
UROBILINOGEN UR QL STRIP.AUTO: (no result) E.U./DL
UROBILINOGEN UR STRIP.AUTO-MCNC: NEGATIVE MG/DL

## 2024-01-11 PROCEDURE — 81003 URINALYSIS AUTO W/O SCOPE: CPT

## 2024-01-11 PROCEDURE — 81001 URINALYSIS AUTO W/SCOPE: CPT

## 2024-01-26 ENCOUNTER — HOSPITAL ENCOUNTER (OUTPATIENT)
Dept: HOSPITAL 76 - SDS | Age: 69
Discharge: HOME | End: 2024-01-26
Attending: SURGERY
Payer: MEDICARE

## 2024-01-26 VITALS — SYSTOLIC BLOOD PRESSURE: 121 MMHG | OXYGEN SATURATION: 97 % | DIASTOLIC BLOOD PRESSURE: 74 MMHG

## 2024-01-26 DIAGNOSIS — D12.2: ICD-10-CM

## 2024-01-26 DIAGNOSIS — K57.30: ICD-10-CM

## 2024-01-26 DIAGNOSIS — Z12.11: Primary | ICD-10-CM

## 2024-01-26 PROCEDURE — 0DBK8ZX EXCISION OF ASCENDING COLON, VIA NATURAL OR ARTIFICIAL OPENING ENDOSCOPIC, DIAGNOSTIC: ICD-10-PCS | Performed by: SURGERY

## 2024-01-26 PROCEDURE — 45380 COLONOSCOPY AND BIOPSY: CPT

## 2024-01-26 PROCEDURE — 0DBF8ZX EXCISION OF RIGHT LARGE INTESTINE, VIA NATURAL OR ARTIFICIAL OPENING ENDOSCOPIC, DIAGNOSTIC: ICD-10-PCS | Performed by: SURGERY

## 2024-01-26 PROCEDURE — 0DBG8ZX EXCISION OF LEFT LARGE INTESTINE, VIA NATURAL OR ARTIFICIAL OPENING ENDOSCOPIC, DIAGNOSTIC: ICD-10-PCS | Performed by: SURGERY

## 2024-01-26 NOTE — ANESTHESIA
Pre-Anesthesia VS, & Labs





- Diagnosis





screening, hx colon polyps





- Procedure





colonoscopy


Height: 5 ft 5 in





- NPO


>8 hours


Last Fluid Intake: am prep





- Pregnancy


Is Patient Pregnant?: No





- Lab Results


Lab results reviewed: Yes





Home Medications and Allergies





                                        





Cholecalciferol (Vitamin D3) [Vitamin D] 2,000 unit PO DAILY 06/04/13 


Red Yeast Rice 600 mg PO DAILY 06/05/13 


Venlafaxine HCl [Effexor Xr] 150 mg PO ONCEDAILY 06/05/13 


ARIPiprazole [Abilify] 5 mg PO DAILY 06/02/20 


Calcium Carbonate [Calcium] 600 mg PO DAILY 06/02/20 


EPINEPHrine [Epinephrine] 0.3 mg IJ ONCE PRN 06/02/20 


Zolpidem Tartrate 10 mg PO QPM PRN 06/02/20 








Allergies/Adverse Reactions: 


                                    Allergies











Allergy/AdvReac Type Severity Reaction Status Date / Time


 


penicillin V [Penicillin V] Allergy Severe Respiratory Verified 01/09/24 17:14


 


venom-honey bee Allergy Severe Respiratory Verified 01/09/24 17:14





[bee venom (honey bee)]     














Anes History & Medical History





- Anesthetic History


Anesthesia Complications: reports: No previous complications


Family history of Anesthesia Complications: Denies


Family history of Malignant Hyperthermia: Denies





- Medical History


Cardiovascular: reports: MI


Pulmonary: reports: None


Gastrointestinal: reports: Colon polyps


Urinary: reports: None


Musculoskeletal: reports: None


Endocrine/Autoimmune: reports: None


Skin: reports: None


Smoking Status: Never smoker





- Surgical History


General: reports: Colonoscopy


Gynecologic: reports: Tubal ligation, Hysterectomy





Exam


General: Alert, Oriented x3, Cooperative


Dental: WNL


Mouth Opening: 3 Fingerbreadth


Neck Mobility: Normal (suscpected difficvult intubation in past, glide scope 

used, no difficulty charted, no DL charted.)


Mallampati classification: II


Respiratory: Lungs clear, Normal breath sounds, No respiratory distress


Cardiovascular: Regular rate


Neurological: Normal speech


Mental/Cognitive Status: Alert/Oriented X3, Normal for patient


Cognitive Status: Within normal limits





Plan


Anesthesia Type: Total IV


Consent for Procedure(s) Verified and Reviewed: Yes


Code Status: Attempt Resuscitation


ASA classification: 3-Severe systemic disease


Is this case an emergency?: No

## 2024-01-26 NOTE — ANESTHESIA POST OP EVALUATION
Anesthesia Post Eval





- Post Anesthesia Eval


Vitals: 





                                Last Vital Signs











Temp  36.3 C L  01/26/24 11:58


 


Pulse  79   01/26/24 12:06


 


Resp  17   01/26/24 12:06


 


BP  137/74 H  01/26/24 12:06


 


Pulse Ox  96   01/26/24 12:06


 


O2 Flow Rate      











CV Function Including HR & BP: Stable


Pain Control: Satisfactory


Nausea & Vomiting: Negative


Mental Status: Baseline


Respiratory Status: Airway Patent


Hydration Status: Satisfactory


Anesthesia Complications: None

## 2024-02-06 ENCOUNTER — HOSPITAL ENCOUNTER (OUTPATIENT)
Dept: HOSPITAL 76 - DI | Age: 69
Discharge: HOME | End: 2024-02-06
Attending: NURSE PRACTITIONER
Payer: MEDICARE

## 2024-02-06 DIAGNOSIS — M79.642: Primary | ICD-10-CM

## 2024-04-29 ENCOUNTER — HOSPITAL ENCOUNTER (OUTPATIENT)
Dept: HOSPITAL 76 - DI | Age: 69
Discharge: HOME | End: 2024-04-29
Attending: NURSE PRACTITIONER
Payer: MEDICARE

## 2024-04-29 DIAGNOSIS — M17.11: Primary | ICD-10-CM

## 2024-04-30 NOTE — XRAY REPORT
PROCEDURE:  Knee 4+V RT

 

INDICATIONS:  RIGHT KNEE PAIN

 

TECHNIQUE:  4 views of the knee(s) were acquired.  

 

COMPARISON:  None.

 

FINDINGS:  

 

Bones:  No fractures or dislocations.  Minimal joint space narrowing most pronounced in the medial co
mpartments. Tiny osteophytes. No suspicious bony lesions.   

 

Soft tissues:  Trace knee joint effusion. No suspicious soft tissue calcifications or masses.  

 

 

IMPRESSION:  

Mild right knee DJD.

 

Reviewed by: Nnamdi Baum MD on 4/30/2024 9:14 AM PDT

Approved by: Nnamdi Baum MD on 4/30/2024 9:14 AM PDT

 

 

Station ID:  [id number]

## 2024-05-15 ENCOUNTER — HOSPITAL ENCOUNTER (OUTPATIENT)
Dept: HOSPITAL 76 - DI | Age: 69
Discharge: HOME | End: 2024-05-15
Attending: NURSE PRACTITIONER
Payer: MEDICARE

## 2024-05-15 DIAGNOSIS — I35.1: ICD-10-CM

## 2024-05-15 DIAGNOSIS — R03.0: Primary | ICD-10-CM

## 2024-05-15 DIAGNOSIS — R05.8: ICD-10-CM

## 2024-05-15 PROCEDURE — 93307 TTE W/O DOPPLER COMPLETE: CPT

## 2025-05-27 NOTE — MAMMOGRAPHY REPORT
BILATERAL DIGITAL SCREENING MAMMOGRAM 3D/2D: 4/9/2021

 

CLINICAL: Routine screening. Personal history of left breast cancer.  

 

Comparison is made to exams dated:  11/7/2019 mammogram, 6/8/2018 mammogram, and 6/2/2017 mammogram -
 [facility].  The tissue of both breasts is heterogeneously dense. This may lower t
he sensitivity of mammography.  

 

There are benign vascular calcifications in both breasts.  There also are benign post operative findi
ngs in the left breast.  

No significant masses, calcifications, or other findings are seen in either breast.  

There has been no significant interval change.

 

IMPRESSION: BENIGN

There is no mammographic evidence of malignancy. A 1 year screening mammogram is recommended.  

 

 

This exam was interpreted at Station ID: [id number].  

 

NOTE: For mammograms, a report in lay terms will be sent to the patient. Approximately 15% of breast 
malignancies will not be visualized mammographically. In the management of a palpable breast mass, a 
negative mammogram must not discourage biopsy of a clinically suspicious lesion.

 

Electronically Signed By: Ag rushing/shalini:4/9/2021 09:52:01  

 

 

 

ACR BI-RADS Category 2: Benign Finding(s) 3342F

PARENCHYMAL PATTERN: (D) - The breast(s) demonstrate(s) heterogeneously dense fibroglandular geovanny snell.

BI-RADS CATEGORY: (2) - 2

RECOMMENDATION: (ANNUAL)  - Recommend routine annual screening mammography.

[id number]

1 year screening

LATERALITY: (B) LVM with patient for earlier appointments available today (due to cancellations) with Dr. Loyola to be seen sooner than her already scheduled 6/19, per clinical staff.